# Patient Record
Sex: FEMALE | Race: WHITE | NOT HISPANIC OR LATINO | Employment: FULL TIME | URBAN - METROPOLITAN AREA
[De-identification: names, ages, dates, MRNs, and addresses within clinical notes are randomized per-mention and may not be internally consistent; named-entity substitution may affect disease eponyms.]

---

## 2023-03-28 LAB
ALANINE AMINOTRANSFERASE (SGPT) (U/L) IN SER/PLAS: 23 U/L (ref 7–45)
ALBUMIN (G/DL) IN SER/PLAS: 4.2 G/DL (ref 3.4–5)
ALKALINE PHOSPHATASE (U/L) IN SER/PLAS: 62 U/L (ref 33–110)
ANION GAP IN SER/PLAS: 13 MMOL/L (ref 10–20)
APPEARANCE, URINE: CLEAR
ASPARTATE AMINOTRANSFERASE (SGOT) (U/L) IN SER/PLAS: 18 U/L (ref 9–39)
BASOPHILS (10*3/UL) IN BLOOD BY AUTOMATED COUNT: 0.05 X10E9/L (ref 0–0.1)
BASOPHILS/100 LEUKOCYTES IN BLOOD BY AUTOMATED COUNT: 0.8 % (ref 0–2)
BILIRUBIN TOTAL (MG/DL) IN SER/PLAS: 0.6 MG/DL (ref 0–1.2)
BILIRUBIN, URINE: NEGATIVE
BLOOD, URINE: NEGATIVE
CALCIUM (MG/DL) IN SER/PLAS: 9.2 MG/DL (ref 8.6–10.3)
CARBON DIOXIDE, TOTAL (MMOL/L) IN SER/PLAS: 28 MMOL/L (ref 21–32)
CHLORIDE (MMOL/L) IN SER/PLAS: 100 MMOL/L (ref 98–107)
CHOLESTEROL (MG/DL) IN SER/PLAS: 222 MG/DL (ref 0–199)
CHOLESTEROL IN HDL (MG/DL) IN SER/PLAS: 56.2 MG/DL
CHOLESTEROL/HDL RATIO: 4
COLOR, URINE: ABNORMAL
CREATININE (MG/DL) IN SER/PLAS: 1.03 MG/DL (ref 0.5–1.05)
EOSINOPHILS (10*3/UL) IN BLOOD BY AUTOMATED COUNT: 0.1 X10E9/L (ref 0–0.7)
EOSINOPHILS/100 LEUKOCYTES IN BLOOD BY AUTOMATED COUNT: 1.6 % (ref 0–6)
ERYTHROCYTE DISTRIBUTION WIDTH (RATIO) BY AUTOMATED COUNT: 14.4 % (ref 11.5–14.5)
ERYTHROCYTE MEAN CORPUSCULAR HEMOGLOBIN CONCENTRATION (G/DL) BY AUTOMATED: 31.4 G/DL (ref 32–36)
ERYTHROCYTE MEAN CORPUSCULAR VOLUME (FL) BY AUTOMATED COUNT: 88 FL (ref 80–100)
ERYTHROCYTES (10*6/UL) IN BLOOD BY AUTOMATED COUNT: 4.79 X10E12/L (ref 4–5.2)
ESTIMATED AVERAGE GLUCOSE FOR HBA1C: 100 MG/DL
GFR FEMALE: 67 ML/MIN/1.73M2
GLUCOSE (MG/DL) IN SER/PLAS: 88 MG/DL (ref 74–99)
GLUCOSE, URINE: NEGATIVE MG/DL
HEMATOCRIT (%) IN BLOOD BY AUTOMATED COUNT: 42.1 % (ref 36–46)
HEMOGLOBIN (G/DL) IN BLOOD: 13.2 G/DL (ref 12–16)
HEMOGLOBIN A1C/HEMOGLOBIN TOTAL IN BLOOD: 5.1 %
IMMATURE GRANULOCYTES/100 LEUKOCYTES IN BLOOD BY AUTOMATED COUNT: 0.5 % (ref 0–0.9)
KETONES, URINE: NEGATIVE MG/DL
LDL: 137 MG/DL (ref 0–99)
LEUKOCYTE ESTERASE, URINE: ABNORMAL
LEUKOCYTES (10*3/UL) IN BLOOD BY AUTOMATED COUNT: 6.2 X10E9/L (ref 4.4–11.3)
LYMPHOCYTES (10*3/UL) IN BLOOD BY AUTOMATED COUNT: 1.84 X10E9/L (ref 1.2–4.8)
LYMPHOCYTES/100 LEUKOCYTES IN BLOOD BY AUTOMATED COUNT: 29.8 % (ref 13–44)
MONOCYTES (10*3/UL) IN BLOOD BY AUTOMATED COUNT: 0.41 X10E9/L (ref 0.1–1)
MONOCYTES/100 LEUKOCYTES IN BLOOD BY AUTOMATED COUNT: 6.6 % (ref 2–10)
MUCUS, URINE: NORMAL /LPF
NEUTROPHILS (10*3/UL) IN BLOOD BY AUTOMATED COUNT: 3.75 X10E9/L (ref 1.2–7.7)
NEUTROPHILS/100 LEUKOCYTES IN BLOOD BY AUTOMATED COUNT: 60.7 % (ref 40–80)
NITRITE, URINE: NEGATIVE
PH, URINE: 7 (ref 5–8)
PLATELETS (10*3/UL) IN BLOOD AUTOMATED COUNT: 224 X10E9/L (ref 150–450)
POTASSIUM (MMOL/L) IN SER/PLAS: 3.6 MMOL/L (ref 3.5–5.3)
PROTEIN TOTAL: 7.3 G/DL (ref 6.4–8.2)
PROTEIN, URINE: NEGATIVE MG/DL
RBC, URINE: 1 /HPF (ref 0–5)
SODIUM (MMOL/L) IN SER/PLAS: 137 MMOL/L (ref 136–145)
SPECIFIC GRAVITY, URINE: 1.01 (ref 1–1.03)
SQUAMOUS EPITHELIAL CELLS, URINE: 2 /HPF
THYROTROPIN (MIU/L) IN SER/PLAS BY DETECTION LIMIT <= 0.05 MIU/L: 3.35 MIU/L (ref 0.44–3.98)
TRIGLYCERIDE (MG/DL) IN SER/PLAS: 144 MG/DL (ref 0–149)
UREA NITROGEN (MG/DL) IN SER/PLAS: 16 MG/DL (ref 6–23)
UROBILINOGEN, URINE: <2 MG/DL (ref 0–1.9)
VLDL: 29 MG/DL (ref 0–40)
WBC, URINE: 2 /HPF (ref 0–5)

## 2023-11-04 ENCOUNTER — LAB (OUTPATIENT)
Dept: LAB | Facility: LAB | Age: 48
End: 2023-11-04
Payer: COMMERCIAL

## 2023-11-04 DIAGNOSIS — E78.5 HYPERLIPIDEMIA, UNSPECIFIED: Primary | ICD-10-CM

## 2023-11-04 DIAGNOSIS — R73.9 HYPERGLYCEMIA, UNSPECIFIED: ICD-10-CM

## 2023-11-04 LAB
ALBUMIN SERPL BCP-MCNC: 4.2 G/DL (ref 3.4–5)
ALP SERPL-CCNC: 80 U/L (ref 33–110)
ALT SERPL W P-5'-P-CCNC: 25 U/L (ref 7–45)
ANION GAP SERPL CALC-SCNC: 15 MMOL/L (ref 10–20)
AST SERPL W P-5'-P-CCNC: 17 U/L (ref 9–39)
BASOPHILS # BLD AUTO: 0.03 X10*3/UL (ref 0–0.1)
BASOPHILS NFR BLD AUTO: 0.5 %
BILIRUB SERPL-MCNC: 0.6 MG/DL (ref 0–1.2)
BUN SERPL-MCNC: 18 MG/DL (ref 6–23)
CALCIUM SERPL-MCNC: 8.9 MG/DL (ref 8.6–10.3)
CHLORIDE SERPL-SCNC: 102 MMOL/L (ref 98–107)
CHOLEST SERPL-MCNC: 256 MG/DL (ref 0–199)
CHOLESTEROL/HDL RATIO: 4.8
CO2 SERPL-SCNC: 27 MMOL/L (ref 21–32)
CREAT SERPL-MCNC: 0.95 MG/DL (ref 0.5–1.05)
EOSINOPHIL # BLD AUTO: 0.1 X10*3/UL (ref 0–0.7)
EOSINOPHIL NFR BLD AUTO: 1.5 %
ERYTHROCYTE [DISTWIDTH] IN BLOOD BY AUTOMATED COUNT: 14 % (ref 11.5–14.5)
EST. AVERAGE GLUCOSE BLD GHB EST-MCNC: 100 MG/DL
GFR SERPL CREATININE-BSD FRML MDRD: 75 ML/MIN/1.73M*2
GLUCOSE SERPL-MCNC: 92 MG/DL (ref 74–99)
HBA1C MFR BLD: 5.1 %
HCT VFR BLD AUTO: 39.3 % (ref 36–46)
HDLC SERPL-MCNC: 53.3 MG/DL
HGB BLD-MCNC: 12.3 G/DL (ref 12–16)
IMM GRANULOCYTES # BLD AUTO: 0.03 X10*3/UL (ref 0–0.7)
IMM GRANULOCYTES NFR BLD AUTO: 0.5 % (ref 0–0.9)
LDLC SERPL CALC-MCNC: 168 MG/DL
LYMPHOCYTES # BLD AUTO: 2.12 X10*3/UL (ref 1.2–4.8)
LYMPHOCYTES NFR BLD AUTO: 32.3 %
MCH RBC QN AUTO: 27.6 PG (ref 26–34)
MCHC RBC AUTO-ENTMCNC: 31.3 G/DL (ref 32–36)
MCV RBC AUTO: 88 FL (ref 80–100)
MONOCYTES # BLD AUTO: 0.41 X10*3/UL (ref 0.1–1)
MONOCYTES NFR BLD AUTO: 6.3 %
NEUTROPHILS # BLD AUTO: 3.87 X10*3/UL (ref 1.2–7.7)
NEUTROPHILS NFR BLD AUTO: 58.9 %
NON HDL CHOLESTEROL: 203 MG/DL (ref 0–149)
NRBC BLD-RTO: 0 /100 WBCS (ref 0–0)
PLATELET # BLD AUTO: 224 X10*3/UL (ref 150–450)
POTASSIUM SERPL-SCNC: 3.7 MMOL/L (ref 3.5–5.3)
PROT SERPL-MCNC: 6.9 G/DL (ref 6.4–8.2)
RBC # BLD AUTO: 4.46 X10*6/UL (ref 4–5.2)
SODIUM SERPL-SCNC: 140 MMOL/L (ref 136–145)
TRIGL SERPL-MCNC: 176 MG/DL (ref 0–149)
VLDL: 35 MG/DL (ref 0–40)
WBC # BLD AUTO: 6.6 X10*3/UL (ref 4.4–11.3)

## 2023-11-04 PROCEDURE — 36415 COLL VENOUS BLD VENIPUNCTURE: CPT

## 2023-11-04 PROCEDURE — 80053 COMPREHEN METABOLIC PANEL: CPT

## 2023-11-04 PROCEDURE — 83036 HEMOGLOBIN GLYCOSYLATED A1C: CPT

## 2023-11-04 PROCEDURE — 80061 LIPID PANEL: CPT

## 2023-11-04 PROCEDURE — 85025 COMPLETE CBC W/AUTO DIFF WBC: CPT

## 2023-11-05 PROBLEM — E03.9 HYPOTHYROIDISM: Status: ACTIVE | Noted: 2023-11-05

## 2023-11-05 PROBLEM — D17.9 LIPOMA: Status: ACTIVE | Noted: 2023-11-05

## 2023-11-05 PROBLEM — M76.62 TENDONITIS, ACHILLES, LEFT: Status: ACTIVE | Noted: 2019-06-13

## 2023-11-05 PROBLEM — I10 HYPERTENSION: Status: ACTIVE | Noted: 2023-11-05

## 2023-11-05 PROBLEM — M79.672 PAIN OF LEFT HEEL: Status: ACTIVE | Noted: 2018-12-20

## 2023-11-05 PROBLEM — E78.5 HYPERLIPIDEMIA: Status: ACTIVE | Noted: 2023-11-05

## 2023-11-05 PROBLEM — E66.01 CLASS 2 SEVERE OBESITY WITH SERIOUS COMORBIDITY AND BODY MASS INDEX (BMI) OF 37.0 TO 37.9 IN ADULT (MULTI): Status: ACTIVE | Noted: 2023-11-05

## 2023-11-05 PROBLEM — E66.812 CLASS 2 SEVERE OBESITY WITH SERIOUS COMORBIDITY AND BODY MASS INDEX (BMI) OF 37.0 TO 37.9 IN ADULT: Status: ACTIVE | Noted: 2023-11-05

## 2023-11-05 PROBLEM — M62.9 NONTRAUMATIC TEAR OF PLANTAR FASCIA: Status: ACTIVE | Noted: 2019-05-16

## 2023-11-05 PROBLEM — M77.32 HEEL SPUR, LEFT: Status: ACTIVE | Noted: 2018-12-20

## 2023-11-05 PROBLEM — H04.129 DRY EYE: Status: ACTIVE | Noted: 2023-11-05

## 2023-11-05 PROBLEM — R73.9 HYPERGLYCEMIA: Status: ACTIVE | Noted: 2023-11-05

## 2023-11-05 PROBLEM — H61.22 IMPACTED CERUMEN OF LEFT EAR: Status: ACTIVE | Noted: 2023-11-05

## 2023-11-05 PROBLEM — Q66.70 PES CAVUS: Status: ACTIVE | Noted: 2018-12-20

## 2023-11-05 PROBLEM — M72.2 PLANTAR FASCIITIS, LEFT: Status: ACTIVE | Noted: 2018-12-20

## 2023-11-05 PROBLEM — R82.90 ABNORMAL URINALYSIS: Status: ACTIVE | Noted: 2023-11-05

## 2023-11-05 RX ORDER — AMOXICILLIN 875 MG/1
875 TABLET, FILM COATED ORAL 2 TIMES DAILY
COMMUNITY
Start: 2010-03-05 | End: 2023-11-07 | Stop reason: ALTCHOICE

## 2023-11-05 RX ORDER — CHLORTHALIDONE 25 MG/1
25 TABLET ORAL DAILY
COMMUNITY
Start: 2022-03-01 | End: 2023-11-07 | Stop reason: SDUPTHER

## 2023-11-05 RX ORDER — VALSARTAN 80 MG/1
80 TABLET ORAL DAILY
COMMUNITY
Start: 2023-09-21 | End: 2023-11-07 | Stop reason: SDUPTHER

## 2023-11-05 RX ORDER — MULTIVITAMIN
1 TABLET ORAL DAILY
COMMUNITY

## 2023-11-05 RX ORDER — ASCORBIC ACID 500 MG
1 TABLET ORAL DAILY
COMMUNITY

## 2023-11-05 RX ORDER — DICLOFENAC SODIUM 10 MG/G
2 GEL TOPICAL 3 TIMES DAILY
COMMUNITY
Start: 2018-12-20 | End: 2023-11-07 | Stop reason: ALTCHOICE

## 2023-11-05 RX ORDER — LEVOTHYROXINE SODIUM 50 UG/1
50 TABLET ORAL DAILY
COMMUNITY
Start: 2021-12-05 | End: 2023-11-07 | Stop reason: SDUPTHER

## 2023-11-07 ENCOUNTER — OFFICE VISIT (OUTPATIENT)
Dept: PRIMARY CARE | Facility: CLINIC | Age: 48
End: 2023-11-07
Payer: COMMERCIAL

## 2023-11-07 VITALS
DIASTOLIC BLOOD PRESSURE: 82 MMHG | HEART RATE: 74 BPM | SYSTOLIC BLOOD PRESSURE: 121 MMHG | WEIGHT: 237 LBS | BODY MASS INDEX: 35.92 KG/M2 | TEMPERATURE: 97.5 F | HEIGHT: 68 IN

## 2023-11-07 DIAGNOSIS — E03.9 HYPOTHYROIDISM, UNSPECIFIED TYPE: ICD-10-CM

## 2023-11-07 DIAGNOSIS — I10 PRIMARY HYPERTENSION: Primary | ICD-10-CM

## 2023-11-07 DIAGNOSIS — E78.5 HYPERLIPIDEMIA, UNSPECIFIED HYPERLIPIDEMIA TYPE: ICD-10-CM

## 2023-11-07 DIAGNOSIS — E66.01 CLASS 2 SEVERE OBESITY DUE TO EXCESS CALORIES WITH SERIOUS COMORBIDITY AND BODY MASS INDEX (BMI) OF 36.0 TO 36.9 IN ADULT (MULTI): ICD-10-CM

## 2023-11-07 PROBLEM — H90.3 SENSORINEURAL HEARING LOSS (SNHL) OF BOTH EARS: Status: ACTIVE | Noted: 2023-11-07

## 2023-11-07 PROBLEM — H61.22 IMPACTED CERUMEN OF LEFT EAR: Status: RESOLVED | Noted: 2023-11-05 | Resolved: 2023-11-07

## 2023-11-07 PROBLEM — E66.812 CLASS 2 SEVERE OBESITY WITH SERIOUS COMORBIDITY AND BODY MASS INDEX (BMI) OF 37.0 TO 37.9 IN ADULT: Status: RESOLVED | Noted: 2023-11-05 | Resolved: 2023-11-07

## 2023-11-07 PROCEDURE — 3008F BODY MASS INDEX DOCD: CPT | Performed by: FAMILY MEDICINE

## 2023-11-07 PROCEDURE — 3074F SYST BP LT 130 MM HG: CPT | Performed by: FAMILY MEDICINE

## 2023-11-07 PROCEDURE — 99214 OFFICE O/P EST MOD 30 MIN: CPT | Performed by: FAMILY MEDICINE

## 2023-11-07 PROCEDURE — 1036F TOBACCO NON-USER: CPT | Performed by: FAMILY MEDICINE

## 2023-11-07 PROCEDURE — 3079F DIAST BP 80-89 MM HG: CPT | Performed by: FAMILY MEDICINE

## 2023-11-07 RX ORDER — CHLORTHALIDONE 25 MG/1
25 TABLET ORAL DAILY
Qty: 90 TABLET | Refills: 1 | Status: SHIPPED | OUTPATIENT
Start: 2023-11-07 | End: 2024-05-06 | Stop reason: SDUPTHER

## 2023-11-07 RX ORDER — VALSARTAN 80 MG/1
80 TABLET ORAL DAILY
Qty: 90 TABLET | Refills: 1 | Status: SHIPPED | OUTPATIENT
Start: 2023-11-07 | End: 2024-05-06 | Stop reason: SDUPTHER

## 2023-11-07 RX ORDER — LEVOTHYROXINE SODIUM 50 UG/1
50 TABLET ORAL DAILY
Qty: 90 TABLET | Refills: 1 | Status: SHIPPED | OUTPATIENT
Start: 2023-11-07 | End: 2024-05-06 | Stop reason: SDUPTHER

## 2023-11-07 ASSESSMENT — PATIENT HEALTH QUESTIONNAIRE - PHQ9
2. FEELING DOWN, DEPRESSED OR HOPELESS: NOT AT ALL
SUM OF ALL RESPONSES TO PHQ9 QUESTIONS 1 AND 2: 0
1. LITTLE INTEREST OR PLEASURE IN DOING THINGS: NOT AT ALL

## 2023-11-07 NOTE — ASSESSMENT & PLAN NOTE
Blood pressure well controlled.  Continue with valsartan and chlorthalidone.  Follow-up in 6 months to reevaluate.

## 2023-11-07 NOTE — PROGRESS NOTES
Subjective   Patient ID: Flower Layne is a 48 y.o. female who presents for Follow-up (6 month follow up. ).  She reports that overall she has been doing well.  She recently got hearing aids and is very pleased with the result.  She states that overall she has been feeling well.  She has been trying to watch her diet and lost 7 pounds since her last visit.  She denies any chest pain or shortness of breath or abdominal pain.  We reviewed that cholesterol was a little bit higher than last time.  She is up-to-date with vaccines.  She recently got the flu shot and COVID booster.  She denies any other concerns.  HPI  Social History     Socioeconomic History    Marital status: Single     Spouse name: Not on file    Number of children: Not on file    Years of education: Not on file    Highest education level: Not on file   Occupational History    Not on file   Tobacco Use    Smoking status: Never    Smokeless tobacco: Never   Substance and Sexual Activity    Alcohol use: Yes     Comment: occasional    Drug use: Never    Sexual activity: Not on file   Other Topics Concern    Not on file   Social History Narrative    Not on file     Social Determinants of Health     Financial Resource Strain: Not on file   Food Insecurity: Not on file   Transportation Needs: Not on file   Physical Activity: Not on file   Stress: Not on file   Social Connections: Not on file   Intimate Partner Violence: Not on file   Housing Stability: Not on file     Current Outpatient Medications   Medication Sig Dispense Refill    ascorbic acid (Vitamin C) 500 mg tablet Take 1 tablet (500 mg) by mouth once daily.      multivit with min-folic acid 0.4 mg tablet Take 1 tablet by mouth once daily.      chlorthalidone (Hygroton) 25 mg tablet Take 1 tablet (25 mg) by mouth once daily. 90 tablet 1    levothyroxine (Synthroid, Levoxyl) 50 mcg tablet Take 1 tablet (50 mcg) by mouth once daily. 90 tablet 1    valsartan (Diovan) 80 mg tablet Take 1 tablet (80  "mg) by mouth once daily. 90 tablet 1     No current facility-administered medications for this visit.     Family History   Problem Relation Name Age of Onset    Hyperlipidemia Mother      Hypertension Mother      Lung cancer Mother      Glaucoma Father      Hypothyroidism Father      Leukemia Father      Heart attack Father      Diabetes type II Father      Myelodysplastic syndrome Father      Stroke Maternal Grandmother      Hypertension Maternal Grandmother      Heart attack Maternal Grandfather      Stroke Paternal Grandmother      Lung cancer Paternal Grandfather      Hypertension Other Sibling      Review of Systems    Review of Systems negative except as noted in HPI and Chief complaint.     Objective                 /82 (BP Location: Left arm, Patient Position: Sitting)   Pulse 74   Temp 36.4 °C (97.5 °F)   Ht 1.727 m (5' 8\")   Wt 108 kg (237 lb)   BMI 36.04 kg/m²    Physical Exam  Vitals reviewed.   HENT:      Head: Normocephalic and atraumatic.      Right Ear: Tympanic membrane normal.      Left Ear: Tympanic membrane normal.      Nose: Nose normal.   Eyes:      Extraocular Movements: Extraocular movements intact.      Conjunctiva/sclera: Conjunctivae normal.      Pupils: Pupils are equal, round, and reactive to light.   Cardiovascular:      Rate and Rhythm: Normal rate and regular rhythm.      Pulses: Normal pulses.   Pulmonary:      Effort: Pulmonary effort is normal.      Breath sounds: Normal breath sounds.   Abdominal:      General: There is no distension.      Palpations: Abdomen is soft.      Tenderness: There is no abdominal tenderness.   Musculoskeletal:         General: Normal range of motion.      Cervical back: Normal range of motion and neck supple.   Lymphadenopathy:      Cervical: No cervical adenopathy.   Neurological:      General: No focal deficit present.      Mental Status: She is alert.       Results for orders placed or performed in visit on 11/04/23 (from the past 96 " hour(s))   Lipid Panel   Result Value Ref Range    Cholesterol 256 (H) 0 - 199 mg/dL    HDL-Cholesterol 53.3 mg/dL    Cholesterol/HDL Ratio 4.8     LDL Calculated 168 (H) <=99 mg/dL    VLDL 35 0 - 40 mg/dL    Triglycerides 176 (H) 0 - 149 mg/dL    Non HDL Cholesterol 203 (H) 0 - 149 mg/dL   CBC and Auto Differential   Result Value Ref Range    WBC 6.6 4.4 - 11.3 x10*3/uL    nRBC 0.0 0.0 - 0.0 /100 WBCs    RBC 4.46 4.00 - 5.20 x10*6/uL    Hemoglobin 12.3 12.0 - 16.0 g/dL    Hematocrit 39.3 36.0 - 46.0 %    MCV 88 80 - 100 fL    MCH 27.6 26.0 - 34.0 pg    MCHC 31.3 (L) 32.0 - 36.0 g/dL    RDW 14.0 11.5 - 14.5 %    Platelets 224 150 - 450 x10*3/uL    Neutrophils % 58.9 40.0 - 80.0 %    Immature Granulocytes %, Automated 0.5 0.0 - 0.9 %    Lymphocytes % 32.3 13.0 - 44.0 %    Monocytes % 6.3 2.0 - 10.0 %    Eosinophils % 1.5 0.0 - 6.0 %    Basophils % 0.5 0.0 - 2.0 %    Neutrophils Absolute 3.87 1.20 - 7.70 x10*3/uL    Immature Granulocytes Absolute, Automated 0.03 0.00 - 0.70 x10*3/uL    Lymphocytes Absolute 2.12 1.20 - 4.80 x10*3/uL    Monocytes Absolute 0.41 0.10 - 1.00 x10*3/uL    Eosinophils Absolute 0.10 0.00 - 0.70 x10*3/uL    Basophils Absolute 0.03 0.00 - 0.10 x10*3/uL   Comprehensive Metabolic Panel   Result Value Ref Range    Glucose 92 74 - 99 mg/dL    Sodium 140 136 - 145 mmol/L    Potassium 3.7 3.5 - 5.3 mmol/L    Chloride 102 98 - 107 mmol/L    Bicarbonate 27 21 - 32 mmol/L    Anion Gap 15 10 - 20 mmol/L    Urea Nitrogen 18 6 - 23 mg/dL    Creatinine 0.95 0.50 - 1.05 mg/dL    eGFR 75 >60 mL/min/1.73m*2    Calcium 8.9 8.6 - 10.3 mg/dL    Albumin 4.2 3.4 - 5.0 g/dL    Alkaline Phosphatase 80 33 - 110 U/L    Total Protein 6.9 6.4 - 8.2 g/dL    AST 17 9 - 39 U/L    Bilirubin, Total 0.6 0.0 - 1.2 mg/dL    ALT 25 7 - 45 U/L   Hemoglobin A1C   Result Value Ref Range    Hemoglobin A1C 5.1 see below %    Estimated Average Glucose 100 Not Established mg/dL       Assessment/Plan   Problem List Items Addressed This  Visit       Hyperlipidemia     Cholesterol is higher than last check.  Continue to work on healthy diet and exercise.  Plan to recheck CMP and lipids.         Relevant Orders    Lipid Panel    Hypertension - Primary     Blood pressure well controlled.  Continue with valsartan and chlorthalidone.  Follow-up in 6 months to reevaluate.         Relevant Medications    chlorthalidone (Hygroton) 25 mg tablet    valsartan (Diovan) 80 mg tablet    Other Relevant Orders    CBC and Auto Differential    Comprehensive Metabolic Panel    Hypothyroidism     Patient euthyroid.  Continue with levothyroxine.  This was refilled.  Recheck TSH in 6 months.         Relevant Medications    levothyroxine (Synthroid, Levoxyl) 50 mcg tablet    Other Relevant Orders    Tsh With Reflex To Free T4 If Abnormal    Class 2 severe obesity due to excess calories with serious comorbidity and body mass index (BMI) of 36.0 to 36.9 in adult (CMS/[facility])

## 2023-11-07 NOTE — ASSESSMENT & PLAN NOTE
Cholesterol is higher than last check.  Continue to work on healthy diet and exercise.  Plan to recheck CMP and lipids.

## 2023-11-20 ENCOUNTER — HOSPITAL ENCOUNTER (OUTPATIENT)
Dept: RADIOLOGY | Facility: HOSPITAL | Age: 48
Discharge: HOME | End: 2023-11-20
Payer: COMMERCIAL

## 2023-11-20 VITALS — HEIGHT: 68 IN | BODY MASS INDEX: 35.92 KG/M2 | WEIGHT: 237 LBS

## 2023-11-20 DIAGNOSIS — Z12.31 ENCOUNTER FOR SCREENING MAMMOGRAM FOR MALIGNANT NEOPLASM OF BREAST: ICD-10-CM

## 2023-11-20 PROCEDURE — 77063 BREAST TOMOSYNTHESIS BI: CPT | Mod: BILATERAL PROCEDURE | Performed by: RADIOLOGY

## 2023-11-20 PROCEDURE — 77067 SCR MAMMO BI INCL CAD: CPT

## 2023-11-20 PROCEDURE — 77067 SCR MAMMO BI INCL CAD: CPT | Mod: BILATERAL PROCEDURE | Performed by: RADIOLOGY

## 2024-03-12 ENCOUNTER — TELEPHONE (OUTPATIENT)
Dept: PRIMARY CARE | Facility: CLINIC | Age: 49
End: 2024-03-12
Payer: COMMERCIAL

## 2024-03-12 NOTE — TELEPHONE ENCOUNTER
----- Message from Lissett Jackson MD sent at 3/12/2024 11:00 AM EDT -----  Regarding: FW: Thank you   Contact: [phone]  Please let patient know that I did put an order for blood work.  Also please let Laura know about this message.  ----- Message -----  From: Vilma Fierro MA  Sent: 3/12/2024   9:11 AM EDT  To: Lissett Jackson MD  Subject: FW: Thank you                                      ----- Message -----  From: Flower Layne  Sent: 3/11/2024   8:35 PM EDT  To: [id number] [place] [id number] Clinical Support Staff  Subject: Thank you                                        Hi Dr. Jackson,    Just wanted to send a quick note to thank you and Laura PERRY for all the help in getting things figured out with Dr. Mederos and my insurance. Glad it all worked out and I can still see Dr. Mederos. You guys are great and I know Laura went above and beyond. She is awesome!     Also, wanted to [place] back on my other question.    Did you want bloodwork for this upcoming visit in May, or since I had bloodwork in November, wait until my later in the year appointment to do bloodwork? If this time, should I  an order from your office as I didn't get one after my last visit.     Thanks again!!    Sincerely,   Flower Layne

## 2024-03-12 NOTE — TELEPHONE ENCOUNTER
T/c to pt, lmom, advised pt that Dr. Jackson put an order for blood work in her chart. Advised pt to give the office a call with any questions or concerns.

## 2024-05-02 ENCOUNTER — LAB (OUTPATIENT)
Dept: LAB | Facility: LAB | Age: 49
End: 2024-05-02
Payer: COMMERCIAL

## 2024-05-02 DIAGNOSIS — E03.9 HYPOTHYROIDISM, UNSPECIFIED TYPE: ICD-10-CM

## 2024-05-02 DIAGNOSIS — I10 PRIMARY HYPERTENSION: ICD-10-CM

## 2024-05-02 DIAGNOSIS — E78.5 HYPERLIPIDEMIA, UNSPECIFIED HYPERLIPIDEMIA TYPE: ICD-10-CM

## 2024-05-02 LAB
ALBUMIN SERPL BCP-MCNC: 4.3 G/DL (ref 3.4–5)
ALP SERPL-CCNC: 73 U/L (ref 33–110)
ALT SERPL W P-5'-P-CCNC: 19 U/L (ref 7–45)
ANION GAP SERPL CALC-SCNC: 15 MMOL/L (ref 10–20)
AST SERPL W P-5'-P-CCNC: 14 U/L (ref 9–39)
BASOPHILS # BLD AUTO: 0.03 X10*3/UL (ref 0–0.1)
BASOPHILS NFR BLD AUTO: 0.5 %
BILIRUB SERPL-MCNC: 0.6 MG/DL (ref 0–1.2)
BUN SERPL-MCNC: 11 MG/DL (ref 6–23)
CALCIUM SERPL-MCNC: 9.3 MG/DL (ref 8.6–10.6)
CHLORIDE SERPL-SCNC: 99 MMOL/L (ref 98–107)
CHOLEST SERPL-MCNC: 233 MG/DL (ref 0–199)
CHOLESTEROL/HDL RATIO: 4.2
CO2 SERPL-SCNC: 27 MMOL/L (ref 21–32)
CREAT SERPL-MCNC: 0.97 MG/DL (ref 0.5–1.05)
EGFRCR SERPLBLD CKD-EPI 2021: 72 ML/MIN/1.73M*2
EOSINOPHIL # BLD AUTO: 0.09 X10*3/UL (ref 0–0.7)
EOSINOPHIL NFR BLD AUTO: 1.5 %
ERYTHROCYTE [DISTWIDTH] IN BLOOD BY AUTOMATED COUNT: 13.9 % (ref 11.5–14.5)
GLUCOSE SERPL-MCNC: 94 MG/DL (ref 74–99)
HCT VFR BLD AUTO: 41 % (ref 36–46)
HDLC SERPL-MCNC: 54.9 MG/DL
HGB BLD-MCNC: 13 G/DL (ref 12–16)
IMM GRANULOCYTES # BLD AUTO: 0.02 X10*3/UL (ref 0–0.7)
IMM GRANULOCYTES NFR BLD AUTO: 0.3 % (ref 0–0.9)
LDLC SERPL CALC-MCNC: 135 MG/DL
LYMPHOCYTES # BLD AUTO: 1.89 X10*3/UL (ref 1.2–4.8)
LYMPHOCYTES NFR BLD AUTO: 32 %
MCH RBC QN AUTO: 27.8 PG (ref 26–34)
MCHC RBC AUTO-ENTMCNC: 31.7 G/DL (ref 32–36)
MCV RBC AUTO: 88 FL (ref 80–100)
MONOCYTES # BLD AUTO: 0.43 X10*3/UL (ref 0.1–1)
MONOCYTES NFR BLD AUTO: 7.3 %
NEUTROPHILS # BLD AUTO: 3.44 X10*3/UL (ref 1.2–7.7)
NEUTROPHILS NFR BLD AUTO: 58.4 %
NON HDL CHOLESTEROL: 178 MG/DL (ref 0–149)
NRBC BLD-RTO: 0 /100 WBCS (ref 0–0)
PLATELET # BLD AUTO: 255 X10*3/UL (ref 150–450)
POTASSIUM SERPL-SCNC: 4.2 MMOL/L (ref 3.5–5.3)
PROT SERPL-MCNC: 6.9 G/DL (ref 6.4–8.2)
RBC # BLD AUTO: 4.67 X10*6/UL (ref 4–5.2)
SODIUM SERPL-SCNC: 137 MMOL/L (ref 136–145)
TRIGL SERPL-MCNC: 216 MG/DL (ref 0–149)
TSH SERPL-ACNC: 2.76 MIU/L (ref 0.44–3.98)
VLDL: 43 MG/DL (ref 0–40)
WBC # BLD AUTO: 5.9 X10*3/UL (ref 4.4–11.3)

## 2024-05-02 PROCEDURE — 36415 COLL VENOUS BLD VENIPUNCTURE: CPT

## 2024-05-02 PROCEDURE — 85025 COMPLETE CBC W/AUTO DIFF WBC: CPT

## 2024-05-02 PROCEDURE — 80053 COMPREHEN METABOLIC PANEL: CPT

## 2024-05-02 PROCEDURE — 84443 ASSAY THYROID STIM HORMONE: CPT

## 2024-05-02 PROCEDURE — 80061 LIPID PANEL: CPT

## 2024-05-06 ENCOUNTER — OFFICE VISIT (OUTPATIENT)
Dept: PRIMARY CARE | Facility: CLINIC | Age: 49
End: 2024-05-06
Payer: COMMERCIAL

## 2024-05-06 VITALS
SYSTOLIC BLOOD PRESSURE: 129 MMHG | TEMPERATURE: 97.2 F | DIASTOLIC BLOOD PRESSURE: 75 MMHG | BODY MASS INDEX: 36.98 KG/M2 | HEART RATE: 72 BPM | WEIGHT: 244 LBS | HEIGHT: 68 IN

## 2024-05-06 DIAGNOSIS — E78.5 HYPERLIPIDEMIA, UNSPECIFIED HYPERLIPIDEMIA TYPE: ICD-10-CM

## 2024-05-06 DIAGNOSIS — E66.01 CLASS 2 SEVERE OBESITY DUE TO EXCESS CALORIES WITH SERIOUS COMORBIDITY AND BODY MASS INDEX (BMI) OF 37.0 TO 37.9 IN ADULT (MULTI): ICD-10-CM

## 2024-05-06 DIAGNOSIS — I10 PRIMARY HYPERTENSION: Primary | ICD-10-CM

## 2024-05-06 DIAGNOSIS — E03.9 HYPOTHYROIDISM, UNSPECIFIED TYPE: ICD-10-CM

## 2024-05-06 DIAGNOSIS — I10 PRIMARY HYPERTENSION: ICD-10-CM

## 2024-05-06 PROCEDURE — 90471 IMMUNIZATION ADMIN: CPT | Performed by: FAMILY MEDICINE

## 2024-05-06 PROCEDURE — 1036F TOBACCO NON-USER: CPT | Performed by: FAMILY MEDICINE

## 2024-05-06 PROCEDURE — 3074F SYST BP LT 130 MM HG: CPT | Performed by: FAMILY MEDICINE

## 2024-05-06 PROCEDURE — 3008F BODY MASS INDEX DOCD: CPT | Performed by: FAMILY MEDICINE

## 2024-05-06 PROCEDURE — 3078F DIAST BP <80 MM HG: CPT | Performed by: FAMILY MEDICINE

## 2024-05-06 PROCEDURE — 90715 TDAP VACCINE 7 YRS/> IM: CPT | Performed by: FAMILY MEDICINE

## 2024-05-06 PROCEDURE — 99214 OFFICE O/P EST MOD 30 MIN: CPT | Performed by: FAMILY MEDICINE

## 2024-05-06 RX ORDER — VALSARTAN 80 MG/1
80 TABLET ORAL DAILY
Qty: 90 TABLET | Refills: 1 | Status: SHIPPED | OUTPATIENT
Start: 2024-05-06

## 2024-05-06 RX ORDER — CHLORTHALIDONE 25 MG/1
25 TABLET ORAL DAILY
Qty: 90 TABLET | Refills: 1 | Status: SHIPPED | OUTPATIENT
Start: 2024-05-06

## 2024-05-06 RX ORDER — LEVOTHYROXINE SODIUM 50 UG/1
50 TABLET ORAL DAILY
Qty: 90 TABLET | Refills: 1 | Status: SHIPPED | OUTPATIENT
Start: 2024-05-06

## 2024-05-06 ASSESSMENT — PATIENT HEALTH QUESTIONNAIRE - PHQ9
2. FEELING DOWN, DEPRESSED OR HOPELESS: NOT AT ALL
1. LITTLE INTEREST OR PLEASURE IN DOING THINGS: NOT AT ALL
SUM OF ALL RESPONSES TO PHQ9 QUESTIONS 1 AND 2: 0

## 2024-05-06 NOTE — ASSESSMENT & PLAN NOTE
Blood pressure well-controlled.  Continue with chlorthalidone and valsartan.  Follow-up in 6 months to reevaluate.

## 2024-05-06 NOTE — PROGRESS NOTES
Subjective   Patient ID: Flower Layne is a 49 y.o. female who presents for Follow-up (6 month follow up. ).  She reports that overall she has been doing well.  She has been trying to watch what she is eating and her cholesterol is improved.  States that otherwise she has been feeling well.  She denies any chest pain or shortness of breath or abdominal pain.  She has been trying to increase activity.  Reports mood is good.  Denies any other concerns.  HPI  Social History     Socioeconomic History    Marital status: Single     Spouse name: Not on file    Number of children: Not on file    Years of education: Not on file    Highest education level: Not on file   Occupational History    Not on file   Tobacco Use    Smoking status: Never    Smokeless tobacco: Never   Substance and Sexual Activity    Alcohol use: Yes     Comment: occasional    Drug use: Never    Sexual activity: Not on file   Other Topics Concern    Not on file   Social History Narrative    Not on file     Social Determinants of Health     Financial Resource Strain: Not on file   Food Insecurity: Not on file   Transportation Needs: Not on file   Physical Activity: Not on file   Stress: Not on file   Social Connections: Not on file   Intimate Partner Violence: Not on file   Housing Stability: Not on file     Current Outpatient Medications   Medication Sig Dispense Refill    ascorbic acid (Vitamin C) 500 mg tablet Take 1 tablet (500 mg) by mouth once daily.      multivit with min-folic acid 0.4 mg tablet Take 1 tablet by mouth once daily.      chlorthalidone (Hygroton) 25 mg tablet Take 1 tablet (25 mg) by mouth once daily. 90 tablet 1    levothyroxine (Synthroid, Levoxyl) 50 mcg tablet Take 1 tablet (50 mcg) by mouth once daily. 90 tablet 1    valsartan (Diovan) 80 mg tablet Take 1 tablet (80 mg) by mouth once daily. 90 tablet 1     No current facility-administered medications for this visit.     Family History   Problem Relation Name Age of Onset     "Hyperlipidemia Mother      Hypertension Mother      Lung cancer Mother      Glaucoma Father      Hypothyroidism Father      Leukemia Father      Heart attack Father      Diabetes type II Father      Myelodysplastic syndrome Father      Stroke Maternal Grandmother      Hypertension Maternal Grandmother      Heart attack Maternal Grandfather      Stroke Paternal Grandmother      Lung cancer Paternal Grandfather      Hypertension Other Sibling      Review of Systems  Immunization History   Administered Date(s) Administered    Flu vaccine (IIV4), preservative free *Check age/dose* 10/01/2021    Flu vaccine, quadrivalent, recombinant, preservative free, adult (FLUBLOK) 10/02/2020    Hepatitis A vaccine, age 19 years and greater (HAVRIX) 08/07/2003, 01/07/2004    Influenza, injectable, quadrivalent 09/29/2022, 10/06/2023    Pfizer COVID-19 vaccine, Fall 2023, 12 years and older, (30mcg/0.3mL) 10/28/2023    Pfizer COVID-19 vaccine, bivalent, age 12 years and older (30 mcg/0.3 mL) 11/03/2022    Pfizer Purple Cap SARS-CoV-2 03/27/2021, 04/18/2021, 10/28/2021    Td vaccine, age 7 years and older (TENIVAC) 08/07/2003    Tdap vaccine, age 7 year and older (BOOSTRIX, ADACEL) 06/12/2014, 05/06/2024    Typhoid, oral 04/10/2016       Review of Systems negative except as noted in HPI and Chief complaint.     Objective                 /75 (BP Location: Left arm, Patient Position: Sitting)   Pulse 72   Temp 36.2 °C (97.2 °F)   Ht 1.727 m (5' 8\")   Wt 111 kg (244 lb)   BMI 37.10 kg/m²    Physical Exam  Vitals reviewed.   HENT:      Head: Normocephalic and atraumatic.      Right Ear: Tympanic membrane normal.      Left Ear: Tympanic membrane normal.      Nose: Nose normal.   Eyes:      Extraocular Movements: Extraocular movements intact.      Conjunctiva/sclera: Conjunctivae normal.      Pupils: Pupils are equal, round, and reactive to light.   Cardiovascular:      Rate and Rhythm: Normal rate and regular rhythm.      Pulses: " Normal pulses.   Pulmonary:      Effort: Pulmonary effort is normal.      Breath sounds: Normal breath sounds.   Abdominal:      General: There is no distension.      Palpations: Abdomen is soft.      Tenderness: There is no abdominal tenderness.   Musculoskeletal:         General: Normal range of motion.      Cervical back: Normal range of motion and neck supple.      Right lower leg: No edema.      Left lower leg: No edema.   Lymphadenopathy:      Cervical: No cervical adenopathy.   Neurological:      General: No focal deficit present.      Mental Status: She is alert.       No results found for this or any previous visit (from the past 96 hour(s)).    Assessment/Plan   Problem List Items Addressed This Visit       Hyperlipidemia     Continue to work on healthy diet and exercise.  Recheck CMP and lipids at 1 year.         Hypertension - Primary     Blood pressure well-controlled.  Continue with chlorthalidone and valsartan.  Follow-up in 6 months to reevaluate.         Relevant Medications    chlorthalidone (Hygroton) 25 mg tablet    valsartan (Diovan) 80 mg tablet    Hypothyroidism     TSH is normal.  Patient clinically euthyroid.  Continue with Synthroid.         Relevant Medications    levothyroxine (Synthroid, Levoxyl) 50 mcg tablet    Class 2 severe obesity due to excess calories with serious comorbidity and body mass index (BMI) of 37.0 to 37.9 in adult (Multi)

## 2024-05-07 RX ORDER — CHLORTHALIDONE 25 MG/1
25 TABLET ORAL DAILY
Qty: 90 TABLET | Refills: 1 | Status: SHIPPED | OUTPATIENT
Start: 2024-05-07

## 2024-07-09 ENCOUNTER — APPOINTMENT (OUTPATIENT)
Dept: OBSTETRICS AND GYNECOLOGY | Facility: CLINIC | Age: 49
End: 2024-07-09
Payer: COMMERCIAL

## 2024-07-09 VITALS
DIASTOLIC BLOOD PRESSURE: 74 MMHG | SYSTOLIC BLOOD PRESSURE: 118 MMHG | OXYGEN SATURATION: 98 % | BODY MASS INDEX: 36.9 KG/M2 | WEIGHT: 243.5 LBS | HEART RATE: 88 BPM | HEIGHT: 68 IN

## 2024-07-09 DIAGNOSIS — Z01.419 WELL WOMAN EXAM WITH ROUTINE GYNECOLOGICAL EXAM: Primary | ICD-10-CM

## 2024-07-09 DIAGNOSIS — Z12.31 VISIT FOR SCREENING MAMMOGRAM: ICD-10-CM

## 2024-07-09 PROCEDURE — 3078F DIAST BP <80 MM HG: CPT | Performed by: OBSTETRICS & GYNECOLOGY

## 2024-07-09 PROCEDURE — 3008F BODY MASS INDEX DOCD: CPT | Performed by: OBSTETRICS & GYNECOLOGY

## 2024-07-09 PROCEDURE — 3074F SYST BP LT 130 MM HG: CPT | Performed by: OBSTETRICS & GYNECOLOGY

## 2024-07-09 PROCEDURE — 1036F TOBACCO NON-USER: CPT | Performed by: OBSTETRICS & GYNECOLOGY

## 2024-07-09 PROCEDURE — 99396 PREV VISIT EST AGE 40-64: CPT | Performed by: OBSTETRICS & GYNECOLOGY

## 2024-07-09 ASSESSMENT — PATIENT HEALTH QUESTIONNAIRE - PHQ9
1. LITTLE INTEREST OR PLEASURE IN DOING THINGS: NOT AT ALL
2. FEELING DOWN, DEPRESSED OR HOPELESS: NOT AT ALL
SUM OF ALL RESPONSES TO PHQ9 QUESTIONS 1 & 2: 0

## 2024-07-09 NOTE — PROGRESS NOTES
"Patient presents for an annual exam  Last PAP 12/11/2020 NEG HPV-  Last Mammogram 11/20/2023 NEG   Not sexually active    Saida Vera, RMA     ANNUAL SUBJECTIVE    Flower Layne is a 49 y.o. female who presents for annual exam today.  Her periods are regular.  She is using nothing for contraception and is happy with this.  She has no complaints today.      PMH - HTN, hypothyroidism    PSH - urethral dilatation     OB history - G0  The patient has never been pregnant.    Last pap -   Normal HPV Negative    Last mammogram - 11/2023    Family history of breast or ovarian cancer - no    OBJECTIVE  /74 (BP Location: Right arm, Patient Position: Sitting, BP Cuff Size: Adult)   Pulse 88   Ht 1.727 m (5' 8\")   Wt 110 kg (243 lb 8 oz)   LMP 06/25/2024   SpO2 98%   Breastfeeding No   BMI 37.02 kg/m²     General Appearance   - consistent with stated age, well groomed and cooperative    Integumentary  - skin warm and dry without rash    Head and Neck  - normalocephalic and neck supple    Chest and Lung Exam  - normal breathing effort, no respiratory distress    Breast  - symmetry noted, no mass palpable, no skin change and no nipple discharge.    Abdomen  - soft, nontender and no hepatomegaly, splenomegaly, or mass    Female Genitourinary  - vulva normal without rash or lesion, normal vaginal rugae, no vaginal discharge, uterus normal size & no palpable masses, no adnexal mass, no adnexal tenderness, no cervical motion tenderness    Peripheral Vascular  - no edema present    ASSESSMENT/PLAN  49 y.o. yo G0 female who presents for annual exam.       Actions performed during this visit include:  - Clinical breast exam normal  - Clinical pelvic exam normal  - Pap: up to date, due next year  - Mammogram ordered for 11/2024  - Contraception none    Please return for your next visit in 1 year.    Niyah Mederos MD    "

## 2024-07-10 ENCOUNTER — APPOINTMENT (OUTPATIENT)
Dept: OBSTETRICS AND GYNECOLOGY | Facility: CLINIC | Age: 49
End: 2024-07-10
Payer: COMMERCIAL

## 2024-10-22 PROBLEM — M72.2 PLANTAR FASCIITIS, LEFT: Status: RESOLVED | Noted: 2018-12-20 | Resolved: 2024-10-22

## 2024-10-22 PROBLEM — M77.32 HEEL SPUR, LEFT: Status: RESOLVED | Noted: 2018-12-20 | Resolved: 2024-10-22

## 2024-10-22 PROBLEM — R82.90 ABNORMAL URINALYSIS: Status: RESOLVED | Noted: 2023-11-05 | Resolved: 2024-10-22

## 2024-10-22 PROBLEM — M79.672 PAIN OF LEFT HEEL: Status: RESOLVED | Noted: 2018-12-20 | Resolved: 2024-10-22

## 2024-11-03 ASSESSMENT — ENCOUNTER SYMPTOMS
ORTHOPNEA: 0
HEADACHES: 0
PALPITATIONS: 0
SWEATS: 0
NECK PAIN: 0
HYPERTENSION: 1
BLURRED VISION: 0
SHORTNESS OF BREATH: 0
PND: 0

## 2024-11-04 ENCOUNTER — APPOINTMENT (OUTPATIENT)
Dept: PRIMARY CARE | Facility: CLINIC | Age: 49
End: 2024-11-04
Payer: COMMERCIAL

## 2024-11-04 VITALS
DIASTOLIC BLOOD PRESSURE: 78 MMHG | HEART RATE: 82 BPM | WEIGHT: 245.2 LBS | BODY MASS INDEX: 37.16 KG/M2 | HEIGHT: 68 IN | SYSTOLIC BLOOD PRESSURE: 126 MMHG

## 2024-11-04 DIAGNOSIS — L98.9 SKIN LESION: ICD-10-CM

## 2024-11-04 DIAGNOSIS — I10 PRIMARY HYPERTENSION: Primary | ICD-10-CM

## 2024-11-04 DIAGNOSIS — E03.9 HYPOTHYROIDISM, UNSPECIFIED TYPE: ICD-10-CM

## 2024-11-04 DIAGNOSIS — E66.01 CLASS 2 SEVERE OBESITY DUE TO EXCESS CALORIES WITH SERIOUS COMORBIDITY AND BODY MASS INDEX (BMI) OF 37.0 TO 37.9 IN ADULT: ICD-10-CM

## 2024-11-04 DIAGNOSIS — E66.812 CLASS 2 SEVERE OBESITY DUE TO EXCESS CALORIES WITH SERIOUS COMORBIDITY AND BODY MASS INDEX (BMI) OF 37.0 TO 37.9 IN ADULT: ICD-10-CM

## 2024-11-04 PROCEDURE — 99214 OFFICE O/P EST MOD 30 MIN: CPT | Performed by: FAMILY MEDICINE

## 2024-11-04 PROCEDURE — 3078F DIAST BP <80 MM HG: CPT | Performed by: FAMILY MEDICINE

## 2024-11-04 PROCEDURE — 3074F SYST BP LT 130 MM HG: CPT | Performed by: FAMILY MEDICINE

## 2024-11-04 PROCEDURE — 3008F BODY MASS INDEX DOCD: CPT | Performed by: FAMILY MEDICINE

## 2024-11-04 PROCEDURE — 1036F TOBACCO NON-USER: CPT | Performed by: FAMILY MEDICINE

## 2024-11-04 RX ORDER — LEVOTHYROXINE SODIUM 50 UG/1
50 TABLET ORAL DAILY
Qty: 90 TABLET | Refills: 1 | Status: SHIPPED | OUTPATIENT
Start: 2024-11-04

## 2024-11-04 RX ORDER — CHLORTHALIDONE 25 MG/1
25 TABLET ORAL DAILY
Qty: 90 TABLET | Refills: 1 | Status: SHIPPED | OUTPATIENT
Start: 2024-11-04

## 2024-11-04 RX ORDER — VALSARTAN 80 MG/1
80 TABLET ORAL DAILY
Qty: 90 TABLET | Refills: 1 | Status: SHIPPED | OUTPATIENT
Start: 2024-11-04

## 2024-11-04 ASSESSMENT — ENCOUNTER SYMPTOMS
HYPERTENSION: 1
SWEATS: 0
PND: 0
NECK PAIN: 0
HEADACHES: 0
ORTHOPNEA: 0
SHORTNESS OF BREATH: 0
PALPITATIONS: 0
BLURRED VISION: 0

## 2024-11-04 NOTE — PROGRESS NOTES
Answers submitted by the patient for this visit:  High Blood Pressure Questionnaire (Submitted on 11/3/2024)  Chief Complaint: Hypertension  Chronicity: chronic  Onset: more than 1 year ago  Progression since onset: resolved  Condition status: controlled  anxiety: No  blurred vision: No  chest pain: No  headaches: No  malaise/fatigue: No  neck pain: No  orthopnea: No  palpitations: No  peripheral edema: No  PND: No  shortness of breath: No  sweats: No  Agents associated with hypertension: thyroid hormones  CAD risks: obesity  Compliance problems: diet  Subjective   Patient ID: Flower Layne is a 49 y.o. female who presents for Follow-up.  Patient presents today for follow-up on her chronic medical problems.  She reports that overall she has been doing well.  She continues to work on eating healthy and exercising.  Reports that mood has been good.  She denies any chest pain or shortness of breath or abdominal pain.  She sees GYN for well woman care.  She denies any other concerns.  Hypertension  This is a chronic problem. The current episode started more than 1 year ago. The problem has been resolved since onset. The problem is controlled. Pertinent negatives include no anxiety, blurred vision, chest pain, headaches, malaise/fatigue, neck pain, orthopnea, palpitations, peripheral edema, PND, shortness of breath or sweats. Agents associated with hypertension include thyroid hormones. Risk factors for coronary artery disease include obesity. Compliance problems include diet.      Social History     Socioeconomic History    Marital status: Single     Spouse name: Not on file    Number of children: Not on file    Years of education: Not on file    Highest education level: Not on file   Occupational History    Not on file   Tobacco Use    Smoking status: Never    Smokeless tobacco: Never   Vaping Use    Vaping status: Never Used   Substance and Sexual Activity    Alcohol use: Yes     Alcohol/week: 2.0 standard drinks  of alcohol     Types: 2 Cans of beer per week     Comment: occasional    Drug use: Never    Sexual activity: Not Currently   Other Topics Concern    Not on file   Social History Narrative    Not on file     Social Drivers of Health     Financial Resource Strain: Not on file   Food Insecurity: Not on file   Transportation Needs: Not on file   Physical Activity: Not on file   Stress: Not on file   Social Connections: Not on file   Intimate Partner Violence: Not on file   Housing Stability: Not on file     Current Outpatient Medications   Medication Sig Dispense Refill    ascorbic acid (Vitamin C) 500 mg tablet Take 1 tablet (500 mg) by mouth once daily.      multivit with min-folic acid 0.4 mg tablet Take 1 tablet by mouth once daily.      chlorthalidone (Hygroton) 25 mg tablet Take 1 tablet (25 mg) by mouth once daily. 90 tablet 1    levothyroxine (Synthroid, Levoxyl) 50 mcg tablet Take 1 tablet (50 mcg) by mouth once daily. 90 tablet 1    valsartan (Diovan) 80 mg tablet Take 1 tablet (80 mg) by mouth once daily. 90 tablet 1     No current facility-administered medications for this visit.     Family History   Problem Relation Name Age of Onset    Hyperlipidemia Mother Olga Layne     Hypertension Mother Olga Layne     Lung cancer Mother Olga Layne     Cancer Mother Olga Layne     Hearing loss Mother Olga Layne     Vision loss Mother Olga Layne     Glaucoma Father Ady Layne     Hypothyroidism Father Ady Layne     Leukemia Father Ady Layne     Heart attack Father Ady Layne     Diabetes type II Father Ady Layne     Myelodysplastic syndrome Father Ady Layne     Blood Disorder Father Ady Layne     Cancer Father Ady Layne     Diabetes Father Ady Layne     Vision loss Father Ady Layne     Stroke Maternal Grandmother Brigette Layne     Hypertension Maternal Grandmother Brigette Hanayik     Heart attack Maternal Grandfather      Stroke Paternal Grandmother      Lung cancer  "Paternal Grandfather      Hypertension Other Sibling     Diabetes Sister Dionna Layne     Hypertension Sister Dionna Layne     Vision loss Sister Dionna Layne      Review of Systems   Constitutional:  Negative for malaise/fatigue.   Eyes:  Negative for blurred vision.   Respiratory:  Negative for shortness of breath.    Cardiovascular:  Negative for chest pain, palpitations, orthopnea and PND.   Musculoskeletal:  Negative for neck pain.   Neurological:  Negative for headaches.     Immunization History   Administered Date(s) Administered    Flu vaccine (IIV4), preservative free *Check age/dose* 10/01/2021    Flu vaccine, quadrivalent, no egg protein, age 6 month or greater (FLUCELVAX) 10/06/2023    Flu vaccine, quadrivalent, recombinant, preservative free, adult (FLUBLOK) 10/02/2020    Flu vaccine, trivalent, preservative free, no egg protein, age 6 months or greater (Flucelvax) 09/20/2024    Hepatitis A vaccine, age 19 years and greater (HAVRIX) 08/07/2003, 01/07/2004    Influenza, injectable, quadrivalent 09/29/2022, 10/06/2023    Pfizer COVID-19 vaccine, 12 years and older, (30mcg/0.3mL) (Comirnaty) 10/28/2023, 10/26/2024    Pfizer COVID-19 vaccine, bivalent, age 12 years and older (30 mcg/0.3 mL) 11/03/2022    Pfizer Purple Cap SARS-CoV-2 03/27/2021, 04/18/2021, 10/28/2021    Td vaccine, age 7 years and older (TENIVAC) 08/07/2003    Tdap vaccine, age 7 year and older (BOOSTRIX, ADACEL) 06/12/2014, 05/06/2024    Typhoid, oral 04/10/2016       Review of Systems negative except as noted in HPI and Chief complaint.     Objective                 /78 (BP Location: Left arm, Patient Position: Sitting)   Pulse 82   Ht 1.727 m (5' 8\")   Wt 111 kg (245 lb 3.2 oz)   BMI 37.28 kg/m²    Physical Exam  Vitals reviewed.   HENT:      Head: Normocephalic and atraumatic.      Right Ear: Tympanic membrane normal.      Left Ear: Tympanic membrane normal.      Nose: Nose normal.      Mouth/Throat:      Pharynx: " "Oropharynx is clear.   Eyes:      Extraocular Movements: Extraocular movements intact.      Conjunctiva/sclera: Conjunctivae normal.      Pupils: Pupils are equal, round, and reactive to light.   Cardiovascular:      Rate and Rhythm: Normal rate and regular rhythm.      Pulses: Normal pulses.   Pulmonary:      Effort: Pulmonary effort is normal.      Breath sounds: Normal breath sounds.   Abdominal:      General: There is no distension.      Palpations: Abdomen is soft.      Tenderness: There is no abdominal tenderness.   Musculoskeletal:         General: Normal range of motion.      Cervical back: Normal range of motion and neck supple.      Right lower leg: No edema.      Left lower leg: No edema.   Lymphadenopathy:      Cervical: No cervical adenopathy.   Neurological:      General: No focal deficit present.      Mental Status: She is alert.       No results found for this or any previous visit (from the past 96 hours).  Lab Results   Component Value Date    WBC 5.9 05/02/2024    HGB 13.0 05/02/2024    HCT 41.0 05/02/2024    MCV 88 05/02/2024     05/02/2024     Lab Results   Component Value Date    GLUCOSE 94 05/02/2024    CALCIUM 9.3 05/02/2024     05/02/2024    K 4.2 05/02/2024    CO2 27 05/02/2024    CL 99 05/02/2024    BUN 11 05/02/2024    CREATININE 0.97 05/02/2024     Lab Results   Component Value Date    CHOL 233 (H) 05/02/2024    CHOL 256 (H) 11/04/2023    CHOL 222 (H) 03/28/2023     Lab Results   Component Value Date    HDL 54.9 05/02/2024    HDL 53.3 11/04/2023    HDL 56.2 03/28/2023     Lab Results   Component Value Date    LDLCALC 135 (H) 05/02/2024    LDLCALC 168 (H) 11/04/2023     Lab Results   Component Value Date    TRIG 216 (H) 05/02/2024    TRIG 176 (H) 11/04/2023    TRIG 144 03/28/2023     No components found for: \"CHOLHDL\"   Lab Results   Component Value Date    TSH 2.76 05/02/2024      Lab Results   Component Value Date    HGBA1C 5.1 11/04/2023      No results found for: " "\"ALBUMINUR\"   Assessment/Plan   Problem List Items Addressed This Visit       Hypertension - Primary     Blood pressure is well-controlled.  Continue with valsartan and chlorthalidone.  Follow-up in 6 months to reevaluate.  Check blood work at that time.         Relevant Medications    valsartan (Diovan) 80 mg tablet    chlorthalidone (Hygroton) 25 mg tablet    Other Relevant Orders    Lipid Panel    Comprehensive Metabolic Panel    CBC and Auto Differential    Albumin-Creatinine Ratio, Urine Random    Hypothyroidism     Patient clinically euthyroid.  Check TSH.  Continue with current dose of Synthroid.         Relevant Medications    levothyroxine (Synthroid, Levoxyl) 50 mcg tablet    Other Relevant Orders    TSH with reflex to Free T4 if abnormal    Class 2 severe obesity due to excess calories with serious comorbidity and body mass index (BMI) of 37.0 to 37.9 in adult    Skin lesion     Patient has 6 to 7 mm erythematous scaly lesion on her scalp.  Recommend seeing dermatology for further evaluation.                 "

## 2024-11-04 NOTE — ASSESSMENT & PLAN NOTE
Blood pressure is well-controlled.  Continue with valsartan and chlorthalidone.  Follow-up in 6 months to reevaluate.  Check blood work at that time.

## 2024-11-04 NOTE — ASSESSMENT & PLAN NOTE
Patient has 6 to 7 mm erythematous scaly lesion on her scalp.  Recommend seeing dermatology for further evaluation.

## 2024-11-25 ENCOUNTER — HOSPITAL ENCOUNTER (OUTPATIENT)
Dept: RADIOLOGY | Facility: HOSPITAL | Age: 49
Discharge: HOME | End: 2024-11-25
Payer: COMMERCIAL

## 2024-11-25 VITALS — BODY MASS INDEX: 37.16 KG/M2 | WEIGHT: 245.2 LBS | HEIGHT: 68 IN

## 2024-11-25 DIAGNOSIS — Z12.31 VISIT FOR SCREENING MAMMOGRAM: ICD-10-CM

## 2024-11-25 PROCEDURE — 77067 SCR MAMMO BI INCL CAD: CPT | Performed by: RADIOLOGY

## 2024-11-25 PROCEDURE — 77067 SCR MAMMO BI INCL CAD: CPT

## 2024-11-25 PROCEDURE — 77063 BREAST TOMOSYNTHESIS BI: CPT | Performed by: RADIOLOGY

## 2025-03-12 ENCOUNTER — PATIENT MESSAGE (OUTPATIENT)
Dept: PRIMARY CARE | Facility: CLINIC | Age: 50
End: 2025-03-12
Payer: COMMERCIAL

## 2025-03-12 DIAGNOSIS — E03.9 HYPOTHYROIDISM, UNSPECIFIED TYPE: ICD-10-CM

## 2025-03-12 DIAGNOSIS — I10 PRIMARY HYPERTENSION: ICD-10-CM

## 2025-03-12 RX ORDER — LEVOTHYROXINE SODIUM 50 UG/1
50 TABLET ORAL DAILY
Qty: 90 TABLET | Refills: 1 | Status: SHIPPED | OUTPATIENT
Start: 2025-03-12

## 2025-03-12 RX ORDER — CHLORTHALIDONE 25 MG/1
25 TABLET ORAL DAILY
Qty: 90 TABLET | Refills: 1 | Status: SHIPPED | OUTPATIENT
Start: 2025-03-12

## 2025-03-12 RX ORDER — VALSARTAN 80 MG/1
80 TABLET ORAL DAILY
Qty: 90 TABLET | Refills: 1 | Status: SHIPPED | OUTPATIENT
Start: 2025-03-12

## 2025-05-04 LAB
ALBUMIN SERPL-MCNC: 4.5 G/DL (ref 3.6–5.1)
ALBUMIN/CREAT UR: 28 MG/G CREAT
ALP SERPL-CCNC: 74 U/L (ref 31–125)
ALT SERPL-CCNC: 23 U/L (ref 6–29)
ANION GAP SERPL CALCULATED.4IONS-SCNC: 11 MMOL/L (CALC) (ref 7–17)
AST SERPL-CCNC: 14 U/L (ref 10–35)
BASOPHILS # BLD AUTO: 40 CELLS/UL (ref 0–200)
BASOPHILS NFR BLD AUTO: 0.6 %
BILIRUB SERPL-MCNC: 0.5 MG/DL (ref 0.2–1.2)
BUN SERPL-MCNC: 17 MG/DL (ref 7–25)
CALCIUM SERPL-MCNC: 9.3 MG/DL (ref 8.6–10.2)
CHLORIDE SERPL-SCNC: 99 MMOL/L (ref 98–110)
CHOLEST SERPL-MCNC: 246 MG/DL
CHOLEST/HDLC SERPL: 4.2 (CALC)
CO2 SERPL-SCNC: 26 MMOL/L (ref 20–32)
CREAT SERPL-MCNC: 0.96 MG/DL (ref 0.5–0.99)
CREAT UR-MCNC: 151 MG/DL (ref 20–275)
EGFRCR SERPLBLD CKD-EPI 2021: 73 ML/MIN/1.73M2
EOSINOPHIL # BLD AUTO: 101 CELLS/UL (ref 15–500)
EOSINOPHIL NFR BLD AUTO: 1.5 %
ERYTHROCYTE [DISTWIDTH] IN BLOOD BY AUTOMATED COUNT: 13.8 % (ref 11–15)
GLUCOSE SERPL-MCNC: 103 MG/DL (ref 65–99)
HCT VFR BLD AUTO: 41 % (ref 35–45)
HDLC SERPL-MCNC: 59 MG/DL
HGB BLD-MCNC: 13.3 G/DL (ref 11.7–15.5)
LDLC SERPL CALC-MCNC: 150 MG/DL (CALC)
LYMPHOCYTES # BLD AUTO: 2064 CELLS/UL (ref 850–3900)
LYMPHOCYTES NFR BLD AUTO: 30.8 %
MCH RBC QN AUTO: 27.9 PG (ref 27–33)
MCHC RBC AUTO-ENTMCNC: 32.4 G/DL (ref 32–36)
MCV RBC AUTO: 86.1 FL (ref 80–100)
MICROALBUMIN UR-MCNC: 4.2 MG/DL
MONOCYTES # BLD AUTO: 429 CELLS/UL (ref 200–950)
MONOCYTES NFR BLD AUTO: 6.4 %
NEUTROPHILS # BLD AUTO: 4067 CELLS/UL (ref 1500–7800)
NEUTROPHILS NFR BLD AUTO: 60.7 %
NONHDLC SERPL-MCNC: 187 MG/DL (CALC)
PLATELET # BLD AUTO: 231 THOUSAND/UL (ref 140–400)
PMV BLD REES-ECKER: 11.2 FL (ref 7.5–12.5)
POTASSIUM SERPL-SCNC: 4.5 MMOL/L (ref 3.5–5.3)
PROT SERPL-MCNC: 6.9 G/DL (ref 6.1–8.1)
RBC # BLD AUTO: 4.76 MILLION/UL (ref 3.8–5.1)
SODIUM SERPL-SCNC: 136 MMOL/L (ref 135–146)
TRIGL SERPL-MCNC: 232 MG/DL
TSH SERPL-ACNC: 3.63 MIU/L
WBC # BLD AUTO: 6.7 THOUSAND/UL (ref 3.8–10.8)

## 2025-05-07 ENCOUNTER — TELEPHONE (OUTPATIENT)
Dept: PRIMARY CARE | Facility: CLINIC | Age: 50
End: 2025-05-07
Payer: COMMERCIAL

## 2025-05-07 NOTE — TELEPHONE ENCOUNTER
----- Message from Lissett Jackson sent at 5/5/2025  9:01 AM EDT -----  Please advise patient that cholesterol is higher than last check.  Blood sugar is mildly elevated.  Other blood work is okay.  Will discuss further at follow-up.  ----- Message -----  From: [employer] Results In  Sent: 5/3/2025   5:13 AM EDT  To: Lissett Jackson MD

## 2025-05-08 ENCOUNTER — APPOINTMENT (OUTPATIENT)
Dept: PRIMARY CARE | Facility: CLINIC | Age: 50
End: 2025-05-08
Payer: COMMERCIAL

## 2025-05-08 VITALS
BODY MASS INDEX: 37.01 KG/M2 | TEMPERATURE: 97.3 F | HEART RATE: 88 BPM | WEIGHT: 244.2 LBS | HEIGHT: 68 IN | SYSTOLIC BLOOD PRESSURE: 118 MMHG | DIASTOLIC BLOOD PRESSURE: 83 MMHG

## 2025-05-08 DIAGNOSIS — Z11.59 NEED FOR HEPATITIS C SCREENING TEST: ICD-10-CM

## 2025-05-08 DIAGNOSIS — E78.5 HYPERLIPIDEMIA, UNSPECIFIED HYPERLIPIDEMIA TYPE: ICD-10-CM

## 2025-05-08 DIAGNOSIS — I10 PRIMARY HYPERTENSION: Primary | ICD-10-CM

## 2025-05-08 DIAGNOSIS — E03.9 HYPOTHYROIDISM, UNSPECIFIED TYPE: ICD-10-CM

## 2025-05-08 DIAGNOSIS — E66.812 CLASS 2 SEVERE OBESITY DUE TO EXCESS CALORIES WITH SERIOUS COMORBIDITY AND BODY MASS INDEX (BMI) OF 37.0 TO 37.9 IN ADULT: ICD-10-CM

## 2025-05-08 DIAGNOSIS — E66.01 CLASS 2 SEVERE OBESITY DUE TO EXCESS CALORIES WITH SERIOUS COMORBIDITY AND BODY MASS INDEX (BMI) OF 37.0 TO 37.9 IN ADULT: ICD-10-CM

## 2025-05-08 DIAGNOSIS — R73.9 HYPERGLYCEMIA: ICD-10-CM

## 2025-05-08 PROCEDURE — 3079F DIAST BP 80-89 MM HG: CPT | Performed by: FAMILY MEDICINE

## 2025-05-08 PROCEDURE — 3008F BODY MASS INDEX DOCD: CPT | Performed by: FAMILY MEDICINE

## 2025-05-08 PROCEDURE — 1036F TOBACCO NON-USER: CPT | Performed by: FAMILY MEDICINE

## 2025-05-08 PROCEDURE — 99214 OFFICE O/P EST MOD 30 MIN: CPT | Performed by: FAMILY MEDICINE

## 2025-05-08 PROCEDURE — 3074F SYST BP LT 130 MM HG: CPT | Performed by: FAMILY MEDICINE

## 2025-05-08 RX ORDER — OXYMETAZOLINE HYDROCHLORIDE 30 G/1
CREAM TOPICAL DAILY
COMMUNITY

## 2025-05-08 RX ORDER — METRONIDAZOLE 7.5 MG/G
CREAM TOPICAL
COMMUNITY
Start: 2025-03-06

## 2025-05-08 NOTE — ASSESSMENT & PLAN NOTE
Blood sugar mildly elevated.  Continue to work on healthy diet and exercise.  Check CMP and hemoglobin A1c.

## 2025-05-08 NOTE — ASSESSMENT & PLAN NOTE
Blood pressure well-controlled.  Continue with current medications.  Check blood work as ordered.  Follow-up in 6 months.

## 2025-05-08 NOTE — PROGRESS NOTES
Subjective   Patient ID: Flower Layne is a 50 y.o. female who presents for Follow-up (htn).  Patient presents today for follow-up on her chronic medical problems.  She reports that overall she has been doing well.  She states that she has not been exercising as much due to the weather but is getting back on track.  She denies any chest pain or shortness of breath or abdominal pain.  Reports mood is good.  Denies any other concerns.  HPI  Social History     Socioeconomic History    Marital status: Single     Spouse name: Not on file    Number of children: Not on file    Years of education: Not on file    Highest education level: Not on file   Occupational History    Not on file   Tobacco Use    Smoking status: Never    Smokeless tobacco: Never   Vaping Use    Vaping status: Never Used   Substance and Sexual Activity    Alcohol use: Yes     Alcohol/week: 2.0 standard drinks of alcohol     Types: 2 Cans of beer per week     Comment: occasional    Drug use: Never    Sexual activity: Not Currently   Other Topics Concern    Not on file   Social History Narrative    Not on file     Social Drivers of Health     Financial Resource Strain: Not on file   Food Insecurity: Not on file   Transportation Needs: Not on file   Physical Activity: Not on file   Stress: Not on file   Social Connections: Not on file   Intimate Partner Violence: Not on file   Housing Stability: Not on file     Current Medications[1]  Family History[2]  Review of Systems  Immunization History   Administered Date(s) Administered    COVID-19, mRNA, LNP-S, PF, 30 mcg/0.3 mL dose 04/18/2021, 10/28/2021    Flu vaccine (IIV4), preservative free *Check age/dose* 10/01/2021    Flu vaccine, quadrivalent, no egg protein, age 6 month or greater (FLUCELVAX) 10/06/2023    Flu vaccine, quadrivalent, recombinant, preservative free, adult (FLUBLOK) 10/02/2020    Flu vaccine, trivalent, preservative free, no egg protein, age 6 months or greater (Flucelvax) 09/20/2024  "   Hepatitis A vaccine, age 19 years and greater (HAVRIX) 08/07/2003, 01/07/2004    Influenza, injectable, quadrivalent 09/29/2022, 10/06/2023    Pfizer COVID-19 vaccine, 12 years and older, (30mcg/0.3mL) (Comirnaty) 10/28/2023, 10/26/2024    Pfizer COVID-19 vaccine, bivalent, age 12 years and older (30 mcg/0.3 mL) 11/03/2022    Pfizer Purple Cap SARS-CoV-2 03/27/2021    Td vaccine, age 7 years and older (TENIVAC) 08/07/2003    Tdap vaccine, age 7 year and older (BOOSTRIX, ADACEL) 06/12/2014, 05/06/2024    Typhoid, oral 04/10/2016       Review of Systems negative except as noted in HPI and Chief complaint.     Objective                 /83 (BP Location: Left arm, Patient Position: Sitting)   Pulse 88   Temp 36.3 °C (97.3 °F)   Ht 1.727 m (5' 8\")   Wt 111 kg (244 lb 3.2 oz)   BMI 37.13 kg/m²    Physical Exam  Vitals reviewed.   HENT:      Head: Normocephalic and atraumatic.      Right Ear: Tympanic membrane normal.      Left Ear: Tympanic membrane normal.      Nose: Nose normal.      Mouth/Throat:      Pharynx: Oropharynx is clear.   Eyes:      Extraocular Movements: Extraocular movements intact.      Conjunctiva/sclera: Conjunctivae normal.      Pupils: Pupils are equal, round, and reactive to light.   Cardiovascular:      Rate and Rhythm: Normal rate and regular rhythm.      Pulses: Normal pulses.   Pulmonary:      Effort: Pulmonary effort is normal.      Breath sounds: Normal breath sounds.   Abdominal:      General: There is no distension.      Palpations: Abdomen is soft.      Tenderness: There is no abdominal tenderness.   Musculoskeletal:         General: Normal range of motion.      Cervical back: Normal range of motion and neck supple.      Right lower leg: No edema.      Left lower leg: No edema.   Lymphadenopathy:      Cervical: No cervical adenopathy.   Neurological:      General: No focal deficit present.      Mental Status: She is alert.       No results found for this or any previous visit " "(from the past 96 hours).  Lab Results   Component Value Date    WBC 6.7 05/02/2025    HGB 13.3 05/02/2025    HCT 41.0 05/02/2025    MCV 86.1 05/02/2025     05/02/2025     Lab Results   Component Value Date    GLUCOSE 103 (H) 05/02/2025    CALCIUM 9.3 05/02/2025     05/02/2025    K 4.5 05/02/2025    CO2 26 05/02/2025    CL 99 05/02/2025    BUN 17 05/02/2025    CREATININE 0.96 05/02/2025     Lab Results   Component Value Date    CHOL 246 (H) 05/02/2025    CHOL 233 (H) 05/02/2024    CHOL 256 (H) 11/04/2023     Lab Results   Component Value Date    HDL 59 05/02/2025    HDL 54.9 05/02/2024    HDL 53.3 11/04/2023     Lab Results   Component Value Date    LDLCALC 150 (H) 05/02/2025    LDLCALC 135 (H) 05/02/2024    LDLCALC 168 (H) 11/04/2023     Lab Results   Component Value Date    TRIG 232 (H) 05/02/2025    TRIG 216 (H) 05/02/2024    TRIG 176 (H) 11/04/2023     No components found for: \"CHOLHDL\"   Lab Results   Component Value Date    TSH 3.63 05/02/2025      Lab Results   Component Value Date    HGBA1C 5.1 11/04/2023      Lab Results   Component Value Date    ALBUMINUR 4.2 05/02/2025      Assessment/Plan   Problem List Items Addressed This Visit       Hyperglycemia    Blood sugar mildly elevated.  Continue to work on healthy diet and exercise.  Check CMP and hemoglobin A1c.         Relevant Orders    Hemoglobin A1C    Hyperlipidemia    Continue to work on healthy diet and exercise.  Recheck CMP and lipids in 6 months.         Relevant Orders    Lipid Panel    Comprehensive Metabolic Panel    CBC and Auto Differential    Hypertension - Primary    Blood pressure well-controlled.  Continue with current medications.  Check blood work as ordered.  Follow-up in 6 months.         Hypothyroidism    Patient clinically euthyroid.  Continue with current dose of Synthroid.  Check TSH.         Relevant Orders    TSH with reflex to Free T4 if abnormal    Class 2 severe obesity due to excess calories with serious " comorbidity and body mass index (BMI) of 37.0 to 37.9 in adult     Other Visit Diagnoses         Need for hepatitis C screening test        Relevant Orders    Hepatitis C antibody                      [1]   Current Outpatient Medications   Medication Sig Dispense Refill    ascorbic acid (Vitamin C) 500 mg tablet Take 1 tablet (500 mg) by mouth once daily.      chlorthalidone (Hygroton) 25 mg tablet Take 1 tablet (25 mg) by mouth once daily. 90 tablet 1    levothyroxine (Synthroid, Levoxyl) 50 mcg tablet Take 1 tablet (50 mcg) by mouth once daily. 90 tablet 1    metroNIDAZOLE (Metrocream) 0.75 % cream Apply topically.      multivit with min-folic acid 0.4 mg tablet Take 1 tablet by mouth once daily.      oxymetazoline (Rhofade) 1 % cream Apply topically once daily.      valsartan (Diovan) 80 mg tablet Take 1 tablet (80 mg) by mouth once daily. 90 tablet 1     No current facility-administered medications for this visit.   [2]   Family History  Problem Relation Name Age of Onset    Hyperlipidemia Mother Olga Layne     Hypertension Mother Olga Layne     Lung cancer Mother Olga Layne     Cancer Mother Olga Layne     Hearing loss Mother Olga Layne     Vision loss Mother Olga Layne     Glaucoma Father Ady Doylerashad     Hypothyroidism Father Ady Doylerashad     Leukemia Father Ady Shaverrhodarashad     Heart attack Father Ady Shavervidal     Diabetes type II Father Ady Shavervidal     Myelodysplastic syndrome Father Ady Shavervidal     Blood Disorder Father Ady Layne     Cancer Father Ady Shavervidal     Diabetes Father Ady Shavervidal     Vision loss Father Ady Shavervidal     Stroke Maternal Grandmother Brigette Doylerashad     Hypertension Maternal Grandmother Brigette Shavervidal     Heart attack Maternal Grandfather      Stroke Paternal Grandmother      Lung cancer Paternal Grandfather      Hypertension Other Sibling     Diabetes Sister Dionna Doylerashad     Hypertension Sister Dionna Layne     Vision loss Sister Dionna Layne

## 2025-07-14 ENCOUNTER — APPOINTMENT (OUTPATIENT)
Dept: OBSTETRICS AND GYNECOLOGY | Facility: CLINIC | Age: 50
End: 2025-07-14
Payer: COMMERCIAL

## 2025-07-14 VITALS
SYSTOLIC BLOOD PRESSURE: 118 MMHG | DIASTOLIC BLOOD PRESSURE: 77 MMHG | BODY MASS INDEX: 37.13 KG/M2 | HEART RATE: 75 BPM | WEIGHT: 245 LBS | OXYGEN SATURATION: 97 % | HEIGHT: 68 IN

## 2025-07-14 DIAGNOSIS — Z12.31 ENCOUNTER FOR SCREENING MAMMOGRAM FOR MALIGNANT NEOPLASM OF BREAST: ICD-10-CM

## 2025-07-14 DIAGNOSIS — Z01.419 WELL WOMAN EXAM WITH ROUTINE GYNECOLOGICAL EXAM: ICD-10-CM

## 2025-07-14 PROCEDURE — 1036F TOBACCO NON-USER: CPT | Performed by: OBSTETRICS & GYNECOLOGY

## 2025-07-14 PROCEDURE — 3074F SYST BP LT 130 MM HG: CPT | Performed by: OBSTETRICS & GYNECOLOGY

## 2025-07-14 PROCEDURE — 87626 HPV SEP HI-RSK TYP&POOL RSLT: CPT

## 2025-07-14 PROCEDURE — 99396 PREV VISIT EST AGE 40-64: CPT | Performed by: OBSTETRICS & GYNECOLOGY

## 2025-07-14 PROCEDURE — 3078F DIAST BP <80 MM HG: CPT | Performed by: OBSTETRICS & GYNECOLOGY

## 2025-07-14 PROCEDURE — 3008F BODY MASS INDEX DOCD: CPT | Performed by: OBSTETRICS & GYNECOLOGY

## 2025-07-14 NOTE — PROGRESS NOTES
"Patient presents for an annual exam  Last PAP 12/11/2020 NEG HPV-  Last Mammogram 11/25/2024 NEG    Saida Vera, RMA    ANNUAL SUBJECTIVE    Flower Layne is a 50 y.o. female who presents for annual exam today.  Her periods are regular.  She is using nothing for contraception and is happy with this.  She has no complaints today.      PMH - HTN, hypothyroidism    PSH - urethral dilatation     OB history - G0  The patient has never been pregnant.    Last pap -   Normal HPV Negative    Last mammogram - 11/2024    Family history of breast or ovarian cancer - no    OBJECTIVE  /77 (BP Location: Left arm, Patient Position: Sitting, BP Cuff Size: Large adult)   Pulse 75   Ht 1.727 m (5' 8\")   Wt 111 kg (245 lb)   LMP 07/05/2025   SpO2 97%   BMI 37.25 kg/m²     General Appearance   - consistent with stated age, well groomed and cooperative    Integumentary  - skin warm and dry without rash    Head and Neck  - normalocephalic and neck supple    Chest and Lung Exam  - normal breathing effort, no respiratory distress    Breast  - symmetry noted, no mass palpable, no skin change and no nipple discharge.    Abdomen  - soft, nontender and no hepatomegaly, splenomegaly, or mass    Female Genitourinary  - vulva normal without rash or lesion, normal vaginal rugae, no vaginal discharge, uterus normal size & no palpable masses, no adnexal mass, no adnexal tenderness, no cervical motion tenderness    Peripheral Vascular  - no edema present    ASSESSMENT/PLAN  50 y.o. yo G0 female who presents for annual exam.       Actions performed during this visit include:  - Clinical breast exam normal  - Clinical pelvic exam normal  - Pap: done today  - Mammogram ordered for 11/2025  - Contraception none    Please return for your next visit in 1 year.    Niyah Mederos MD      "

## 2025-07-30 LAB
CYTOLOGY CMNT CVX/VAG CYTO-IMP: NORMAL
HPV HR 12 DNA GENITAL QL NAA+PROBE: NEGATIVE
HPV HR GENOTYPES PNL CVX NAA+PROBE: NEGATIVE
HPV16 DNA SPEC QL NAA+PROBE: NEGATIVE
HPV18 DNA SPEC QL NAA+PROBE: NEGATIVE
LAB AP HPV GENOTYPE QUESTION: YES
LAB AP HPV HR: NORMAL
LABORATORY COMMENT REPORT: NORMAL
PATH REPORT.TOTAL CANCER: NORMAL

## 2025-11-11 ENCOUNTER — APPOINTMENT (OUTPATIENT)
Dept: PRIMARY CARE | Facility: CLINIC | Age: 50
End: 2025-11-11
Payer: COMMERCIAL

## 2025-11-26 ENCOUNTER — APPOINTMENT (OUTPATIENT)
Dept: RADIOLOGY | Facility: HOSPITAL | Age: 50
End: 2025-11-26
Payer: COMMERCIAL

## 2026-07-17 ENCOUNTER — APPOINTMENT (OUTPATIENT)
Dept: OBSTETRICS AND GYNECOLOGY | Facility: CLINIC | Age: 51
End: 2026-07-17
Payer: COMMERCIAL